# Patient Record
Sex: FEMALE | Race: WHITE | Employment: FULL TIME | URBAN - METROPOLITAN AREA
[De-identification: names, ages, dates, MRNs, and addresses within clinical notes are randomized per-mention and may not be internally consistent; named-entity substitution may affect disease eponyms.]

---

## 2018-08-14 ENCOUNTER — PATIENT OUTREACH (OUTPATIENT)
Dept: CARE COORDINATION | Facility: CLINIC | Age: 28
End: 2018-08-14

## 2018-08-14 DIAGNOSIS — F41.9 ANXIETY: Primary | ICD-10-CM

## 2018-08-31 ENCOUNTER — PATIENT OUTREACH (OUTPATIENT)
Dept: CARE COORDINATION | Facility: CLINIC | Age: 28
End: 2018-08-31

## 2018-09-11 ENCOUNTER — PATIENT OUTREACH (OUTPATIENT)
Dept: CARE COORDINATION | Facility: CLINIC | Age: 28
End: 2018-09-11

## 2019-10-22 ENCOUNTER — HOSPITAL ENCOUNTER (EMERGENCY)
Facility: CLINIC | Age: 29
Discharge: HOME | End: 2019-10-23
Attending: EMERGENCY MEDICINE | Admitting: EMERGENCY MEDICINE
Payer: COMMERCIAL

## 2019-10-22 DIAGNOSIS — T78.40XA ALLERGIC REACTION, INITIAL ENCOUNTER: ICD-10-CM

## 2019-10-22 PROCEDURE — 99285 EMERGENCY DEPT VISIT HI MDM: CPT

## 2019-10-22 PROCEDURE — 96372 THER/PROPH/DIAG INJ SC/IM: CPT

## 2019-10-22 PROCEDURE — 25000131 ZZH RX MED GY IP 250 OP 636 PS 637: Performed by: EMERGENCY MEDICINE

## 2019-10-22 PROCEDURE — 25000125 ZZHC RX 250

## 2019-10-22 RX ORDER — EPINEPHRINE 1 MG/ML
0.3 INJECTION, SOLUTION, CONCENTRATE INTRAVENOUS ONCE
Status: COMPLETED | OUTPATIENT
Start: 2019-10-22 | End: 2019-10-22

## 2019-10-22 RX ORDER — ONDANSETRON 4 MG/1
4 TABLET, ORALLY DISINTEGRATING ORAL ONCE
Status: DISCONTINUED | OUTPATIENT
Start: 2019-10-22 | End: 2019-10-22

## 2019-10-22 RX ORDER — PREDNISONE 20 MG/1
60 TABLET ORAL ONCE
Status: COMPLETED | OUTPATIENT
Start: 2019-10-22 | End: 2019-10-22

## 2019-10-22 RX ADMIN — EPINEPHRINE 0.3 MG: 1 INJECTION INTRAMUSCULAR; INTRAVENOUS; SUBCUTANEOUS at 23:10

## 2019-10-22 RX ADMIN — EPINEPHRINE 0.3 MG: 1 INJECTION, SOLUTION, CONCENTRATE INTRAVENOUS at 23:10

## 2019-10-22 RX ADMIN — PREDNISONE 60 MG: 20 TABLET ORAL at 23:12

## 2019-10-22 ASSESSMENT — MIFFLIN-ST. JEOR: SCORE: 1349.11

## 2019-10-22 ASSESSMENT — ENCOUNTER SYMPTOMS
FACIAL SWELLING: 1
COLOR CHANGE: 1

## 2019-10-22 NOTE — ED AVS SNAPSHOT
Emergency Department  [address] [place] [id number]  Phone:  [phone]  Fax:  [phone]                                    Efrain Ko   MRN: [id number]    Department:   Emergency Department   Date of Visit:  10/22/2019           After Visit Summary Signature Page    I have received my discharge instructions, and my questions have been answered. I have discussed any challenges I see with this plan with the nurse or doctor.    ..........................................................................................................................................  Patient/Patient Representative Signature      ..........................................................................................................................................  Patient Representative Print Name and Relationship to Patient    ..................................................               ................................................  Date                                   Time    ..........................................................................................................................................  Reviewed by Signature/Title    ...................................................              ..............................................  Date                                               Time          22EPIC Rev 08/18

## 2019-10-23 VITALS
SYSTOLIC BLOOD PRESSURE: 115 MMHG | HEART RATE: 85 BPM | OXYGEN SATURATION: 99 % | HEIGHT: 66 IN | WEIGHT: 135 LBS | RESPIRATION RATE: 14 BRPM | BODY MASS INDEX: 21.69 KG/M2 | DIASTOLIC BLOOD PRESSURE: 69 MMHG

## 2019-10-23 PROCEDURE — 25000128 H RX IP 250 OP 636

## 2019-10-23 RX ORDER — ONDANSETRON 4 MG/1
4 TABLET, ORALLY DISINTEGRATING ORAL ONCE
Status: COMPLETED | OUTPATIENT
Start: 2019-10-23 | End: 2019-10-23

## 2019-10-23 RX ORDER — ONDANSETRON 4 MG/1
TABLET, ORALLY DISINTEGRATING ORAL
Status: COMPLETED
Start: 2019-10-23 | End: 2019-10-23

## 2019-10-23 RX ORDER — EPINEPHRINE 0.3 MG/.3ML
0.3 INJECTION SUBCUTANEOUS
Qty: 2 EACH | Refills: 0 | Status: SHIPPED | OUTPATIENT
Start: 2019-10-23

## 2019-10-23 RX ORDER — PREDNISONE 20 MG/1
60 TABLET ORAL DAILY
Qty: 6 TABLET | Refills: 0 | Status: SHIPPED | OUTPATIENT
Start: 2019-10-23 | End: 2019-10-25

## 2019-10-23 RX ADMIN — ONDANSETRON 4 MG: 4 TABLET, ORALLY DISINTEGRATING ORAL at 01:05

## 2019-10-23 ASSESSMENT — ENCOUNTER SYMPTOMS
ABDOMINAL PAIN: 0
SHORTNESS OF BREATH: 0
NAUSEA: 0

## 2019-10-23 NOTE — ED NOTES
Pt states that she was eating an eggroll tonight.  Around 2130 she developed itching, redness across the body, and throat tightness.

## 2019-10-23 NOTE — ED PROVIDER NOTES
"  History     Chief Complaint:  allergic reaction     HPI  Nia Ko is a 29 year old female who presents with an allergic reaction. The patient noticed throat and facial swelling as well as whole body redness and itching at around 2200 this evening. She took one dose of Allegra just after 2200 and has since noticed to improvement of her symptoms. Here, she states she believes this is a result of something she eat. She states she has never come to the hospital for an allergic reaction before but she has had similar symptoms to this just never as severe. She denies any tongue swelling.    Allergies:  Sulfa drugs    Medications:    Birth control    Past Medical History:    The patient denies any significant past medical history.    Past Surgical History:    Tonsillectomy     Family History:    No past pertinent family history.    Social History:  Marital Status:  Single   Smoker:   Unknown   Alcohol:   Unknown   Drugs:   Unknown   Arrives with:   Mother     Review of Systems   HENT: Positive for facial swelling.    Respiratory: Negative for shortness of breath.    Cardiovascular: Negative for chest pain.   Gastrointestinal: Negative for abdominal pain and nausea.   Skin: Positive for color change and rash.   All other systems reviewed and are negative.    Physical Exam     Patient Vitals for the past 24 hrs:   BP Pulse Heart Rate Resp SpO2 Height Weight   10/23/19 0100 115/69 85 79 14 99 % -- --   10/23/19 0000 103/67 74 79 11 98 % -- --   10/22/19 2300 118/84 87 -- 16 97 % 1.676 m (5' 6\") 61.2 kg (135 lb)     Physical Exam  General: Appears well-developed and well-nourished.   Head: No signs of trauma.   Mouth/Throat: Oropharynx is clear and moist. No oral swelling noted.  Eyes: Conjunctivae are normal.   Neck: Normal range of motion. No nuchal rigidity. No cervical adenopathy  CV: Normal rate and regular rhythm.    Resp: Effort normal and breath sounds normal. No respiratory distress.   GI: Soft. There is no " tenderness.  No rebound or guarding.  Normal bowel sounds.    MSK: Normal range of motion.   Neuro: The patient is alert and oriented.  Speech normal.  GCS 15  Skin: Skin is warm and dry. Diffuse erythema  Psych: normal mood and affect. behavior is normal.       Emergency Department Course   Interventions:  2312: Adrenalin 0.3 mg IM  2312: Prednisone 60 mg PO  0105: Zofran 4 mg ODT    Emergency Department Course:  2301 I performed an exam of the patient as documented above.   0100 I rechecked the patient and discussed the results of their workup thus far.     Findings and plan explained. Patient discharged home with instructions regarding supportive care, medications, and reasons to return. The importance of close follow-up was reviewed. I personally answered all related questions prior to discharge.    Impression & Plan    Medical Decision Making:  Nia Ko is a 29 year old years old female who presents for rash, itching, and concern for an allergic reaction.  She reports that she has had less severe episodes in the past.  On my evaluation, she did have a diffuse rash.  There is no obvious external oral swelling, but the patient did report a feeling of some tightness in her throat.  Given this, I felt that epinephrine would be indicated.  She was given epinephrine IM along with prednisone.  She has already taken an antihistamine prior to coming to the hospital.  She was monitored for 3 hours over which time she had significant improvement of her symptoms.  She remained stable throughout her time.  The exact cause of her allergic reaction is not clear but given her response to the treatment, I feel she is appropriate for discharge and outpatient management at this time.  Recommend to follow-up with her primary care doctor and discuss possibly seeing an allergist.  I did give a couple more days of prednisone along with a prescription for an EpiPen to be used if necessary.  She instructed return for any worsening  symptoms.    Diagnosis:    ICD-10-CM    1. Allergic reaction, initial encounter T78.40XA      Disposition:  discharged to home with epinephrine and deltasone.     Discharge Medications:  Discharge Medication List as of 10/23/2019  1:58 AM      START taking these medications    Details   EPINEPHrine (EPIPEN/ADRENACLICK/OR ANY BX GENERIC EQUIV) 0.3 MG/0.3ML injection 2-pack Inject 0.3 mLs (0.3 mg) into the muscle once as needed for anaphylaxis, Disp-2 each, R-0, Local Print      predniSONE (DELTASONE) 20 MG tablet Take 3 tablets (60 mg) by mouth daily for 2 days, Disp-6 tablet, R-0, Local Print           Scribe Disclosure:  I, Mark Saunders, am serving as a scribe on 10/22/2019 at 11:01 PM to personally document services performed by Dave Nobles MD based on my observations and the provider's statements to me.        Dave Nobles MD  10/23/19 0284

## 2019-10-23 NOTE — ED TRIAGE NOTES
Allergic rxn to unknown substance. Took Allegra PTA. C/O redness, swelling, rash, and hoarse throat.